# Patient Record
Sex: MALE | Race: WHITE | NOT HISPANIC OR LATINO
[De-identification: names, ages, dates, MRNs, and addresses within clinical notes are randomized per-mention and may not be internally consistent; named-entity substitution may affect disease eponyms.]

---

## 2020-12-03 ENCOUNTER — APPOINTMENT (OUTPATIENT)
Dept: UROLOGY | Facility: CLINIC | Age: 45
End: 2020-12-03
Payer: MEDICAID

## 2020-12-03 DIAGNOSIS — N50.819 TESTICULAR PAIN, UNSPECIFIED: ICD-10-CM

## 2020-12-03 PROBLEM — Z00.00 ENCOUNTER FOR PREVENTIVE HEALTH EXAMINATION: Status: ACTIVE | Noted: 2020-12-03

## 2020-12-03 PROCEDURE — 99072 ADDL SUPL MATRL&STAF TM PHE: CPT

## 2020-12-03 PROCEDURE — 99204 OFFICE O/P NEW MOD 45 MIN: CPT

## 2020-12-03 NOTE — ASSESSMENT
[FreeTextEntry1] : groin pain\par benign exam\par ? tenderness at external inguinal ring; possible early hernia? consult GS\par \par Hx of nephrolithiasis\par ? referred pain\par \par Plan for renal/scrotal US

## 2020-12-03 NOTE — HISTORY OF PRESENT ILLNESS
[FreeTextEntry1] : CC: Testicular pain, groin pain  \par \par HPI: \par 45 year old man complaining of orchalgia\par Duration: few months\par Laterality:  right \par Severity: 4/10 \par Stability: stable intensity, but more frequent pain episodes. \par Intermittent pain \par Alleviating factors:none\par Exacerbating factors: intercourse\par No n/v/fever, no LUTS other than some intermittency\par Quality: "pressing" pain, not sharp or dull \par No groin bulge, hernia, flank pain. \par \par FAMHX: Negative  \par SURGHX: Left groin mass excision (epidermal inclusion cyst), gastric sleeve\par SOCIAL: Nonsmoker, , children (9)\par ROS: Gallstones, epigastric pain, otherwise negative 10 system review \par

## 2020-12-03 NOTE — PHYSICAL EXAM
[General Appearance - Well Developed] : well developed [General Appearance - Well Nourished] : well nourished [Normal Appearance] : normal appearance [Well Groomed] : well groomed [General Appearance - In No Acute Distress] : no acute distress [Edema] : no peripheral edema [] : no respiratory distress [Respiration, Rhythm And Depth] : normal respiratory rhythm and effort [Exaggerated Use Of Accessory Muscles For Inspiration] : no accessory muscle use [Abdomen Soft] : soft [Abdomen Tenderness] : non-tender [Abdomen Mass (___ Cm)] : no abdominal mass palpated [Costovertebral Angle Tenderness] : no ~M costovertebral angle tenderness [Urethral Meatus] : meatus normal [Penis Abnormality] : normal circumcised penis [Urinary Bladder Findings] : the bladder was normal on palpation [Scrotum] : the scrotum was normal [Epididymis] : the epididymides were normal [Testes Tenderness] : no tenderness of the testes [Testes Mass (___cm)] : there were no testicular masses [FreeTextEntry1] : totally non tender genitals; tender to palpation at site of ext inguinal ring [Normal Station and Gait] : the gait and station were normal for the patient's age [Oriented To Time, Place, And Person] : oriented to person, place, and time [Affect] : the affect was normal [Mood] : the mood was normal [Not Anxious] : not anxious [No Palpable Adenopathy] : no palpable adenopathy

## 2020-12-16 ENCOUNTER — RESULT REVIEW (OUTPATIENT)
Age: 45
End: 2020-12-16

## 2020-12-16 ENCOUNTER — OUTPATIENT (OUTPATIENT)
Dept: OUTPATIENT SERVICES | Facility: HOSPITAL | Age: 45
LOS: 1 days | End: 2020-12-16

## 2020-12-16 ENCOUNTER — APPOINTMENT (OUTPATIENT)
Dept: ULTRASOUND IMAGING | Facility: CLINIC | Age: 45
End: 2020-12-16
Payer: MEDICAID

## 2020-12-16 PROCEDURE — 76870 US EXAM SCROTUM: CPT | Mod: 26

## 2020-12-16 PROCEDURE — 76770 US EXAM ABDO BACK WALL COMP: CPT | Mod: 26,59

## 2020-12-16 PROCEDURE — 93975 VASCULAR STUDY: CPT | Mod: 26

## 2021-06-22 ENCOUNTER — APPOINTMENT (OUTPATIENT)
Dept: UROLOGY | Facility: CLINIC | Age: 46
End: 2021-06-22
Payer: MEDICAID

## 2021-06-22 VITALS
BODY MASS INDEX: 35.61 KG/M2 | WEIGHT: 235 LBS | DIASTOLIC BLOOD PRESSURE: 77 MMHG | HEIGHT: 68 IN | OXYGEN SATURATION: 98 % | HEART RATE: 77 BPM | SYSTOLIC BLOOD PRESSURE: 141 MMHG | TEMPERATURE: 97.3 F

## 2021-06-22 PROCEDURE — 99213 OFFICE O/P EST LOW 20 MIN: CPT

## 2021-06-22 PROCEDURE — 76775 US EXAM ABDO BACK WALL LIM: CPT

## 2021-06-22 NOTE — HISTORY OF PRESENT ILLNESS
[FreeTextEntry1] : Orchalgia, history of stones\par \par US scrotum benign findings\par Mild intermittent discomfort \par Nothing acute\par \par Renal US today, simple cyst, no stones or hydro

## 2021-12-01 ENCOUNTER — TRANSCRIPTION ENCOUNTER (OUTPATIENT)
Age: 46
End: 2021-12-01

## 2022-11-03 ENCOUNTER — APPOINTMENT (OUTPATIENT)
Dept: UROLOGY | Facility: CLINIC | Age: 47
End: 2022-11-03

## 2022-11-03 VITALS
DIASTOLIC BLOOD PRESSURE: 84 MMHG | TEMPERATURE: 98.3 F | SYSTOLIC BLOOD PRESSURE: 127 MMHG | OXYGEN SATURATION: 98 % | HEART RATE: 98 BPM

## 2022-11-03 PROCEDURE — 99213 OFFICE O/P EST LOW 20 MIN: CPT

## 2022-11-03 RX ORDER — TAMSULOSIN HYDROCHLORIDE 0.4 MG/1
0.4 CAPSULE ORAL
Qty: 30 | Refills: 1 | Status: ACTIVE | COMMUNITY
Start: 2022-11-03 | End: 1900-01-01

## 2022-11-03 NOTE — PHYSICAL EXAM
[General Appearance - Well Developed] : well developed [General Appearance - Well Nourished] : well nourished [Normal Appearance] : normal appearance [Well Groomed] : well groomed [General Appearance - In No Acute Distress] : no acute distress [Edema] : no peripheral edema [] : no respiratory distress [Respiration, Rhythm And Depth] : normal respiratory rhythm and effort [Exaggerated Use Of Accessory Muscles For Inspiration] : no accessory muscle use [Abdomen Soft] : soft [Abdomen Tenderness] : non-tender [FreeTextEntry1] : NO CVAT  [Urinary Bladder Findings] : the bladder was normal on palpation

## 2022-11-03 NOTE — HISTORY OF PRESENT ILLNESS
[FreeTextEntry1] : CC: Flank pain \par \par \par HPI: \par 47 year old with history of colic and stones. \par \par The patient states that 2 months ago, he had right colic, his friend, a medic, administered IV, he was given 2L fluid, and felt better, no pain until 2 days ago. \par \par Two days ago, right colic returned. Location is right flank and radiates to RLQ. \par No loss of appetite; able to keep down food and fluids. \par Intermittent\par Has been 10/10 this morning, now 4/10. \par No N/V\par No fever\par No gross hematuria \par It kept him up last night; minimal sleep\par He has been taking NSAID and he cannot say for sure if helping. \par \par Renal US 12/2020 and 6/2021 no stones \par \par He has passed stones before in the past; never needed intervention. \par \par FAMHX: Negative  \par SURGHX: Left groin mass excision (epidermal inclusion cyst), gastric sleeve\par SOCIAL: Nonsmoker, , children (9)\par ROS: Gallstones, epigastric pain, otherwise negative 10 system review

## 2022-11-03 NOTE — ASSESSMENT
[FreeTextEntry1] : Diagnosis: \par Presumptive renal colic/stones \par \par Plan:\par Discussed renal/bladder US vs. CT\par Agrees to non-con low dose CT\par \par UCX\par \par Isai Hawkins MD, FACS, FRCS \par  of Urology [facility]\par Director of Laparoscopic and Robotic Surgery \par [facility] Director of Urology, [facility] \par Professor of Urology\par \par (Office) \par (Cell)  [phone] \par [email or web address]\par \par \par

## 2022-11-04 DIAGNOSIS — N28.89 OTHER SPECIFIED DISORDERS OF KIDNEY AND URETER: ICD-10-CM

## 2022-11-08 ENCOUNTER — APPOINTMENT (OUTPATIENT)
Dept: UROLOGY | Facility: CLINIC | Age: 47
End: 2022-11-08

## 2022-11-18 ENCOUNTER — NON-APPOINTMENT (OUTPATIENT)
Age: 47
End: 2022-11-18

## 2024-05-15 ENCOUNTER — APPOINTMENT (OUTPATIENT)
Dept: UROLOGY | Facility: CLINIC | Age: 49
End: 2024-05-15
Payer: COMMERCIAL

## 2024-05-15 ENCOUNTER — NON-APPOINTMENT (OUTPATIENT)
Age: 49
End: 2024-05-15

## 2024-05-15 VITALS
WEIGHT: 290 LBS | DIASTOLIC BLOOD PRESSURE: 89 MMHG | TEMPERATURE: 98 F | OXYGEN SATURATION: 96 % | SYSTOLIC BLOOD PRESSURE: 140 MMHG | HEIGHT: 70 IN | BODY MASS INDEX: 41.52 KG/M2 | HEART RATE: 84 BPM

## 2024-05-15 DIAGNOSIS — R10.9 UNSPECIFIED ABDOMINAL PAIN: ICD-10-CM

## 2024-05-15 DIAGNOSIS — Z87.442 PERSONAL HISTORY OF URINARY CALCULI: ICD-10-CM

## 2024-05-15 PROCEDURE — 99214 OFFICE O/P EST MOD 30 MIN: CPT

## 2024-05-15 NOTE — ASSESSMENT
[FreeTextEntry1] : 48 yo male with 48 hours of left flank pain in the setting of history of stones.  Renal US does not show any evidence of an obstructing stone.  His urine dip was negative for blood yesterday.  It appears less likely that this is an episode renal colic and more likely due to other etiologies.  We discussed proceeding with a CT for more definitive evaluation fo his  tract and the rest of his abdominal and pelvis to determine the source of his pain.  He agrees with this plan, but does not know if he can wait for a CT to be approved.  he is considering going to the [facility] standalone ED for further evaluation closer to his home where he should be able to get a CT scan today.  He will forward the results of the scan for my review.

## 2024-05-15 NOTE — PHYSICAL EXAM
[General Appearance - Well Nourished] : well nourished [General Appearance - Well Developed] : well developed [Heart Rate And Rhythm] : heart rate and rhythm were normal [] : no respiratory distress [Abdomen Soft] : soft [Abdomen Tenderness] : non-tender [Costovertebral Angle Tenderness] : no ~M costovertebral angle tenderness [Urethral Meatus] : meatus normal [Penis Abnormality] : normal circumcised penis [Epididymis] : the epididymides were normal [Testes Tenderness] : no tenderness of the testes [Testes Mass (___cm)] : there were no testicular masses

## 2024-05-16 LAB
APPEARANCE: CLEAR
BACTERIA: NEGATIVE /HPF
BILIRUBIN URINE: NEGATIVE
BLOOD URINE: NEGATIVE
CAST: 5 /LPF
COLOR: NORMAL
EPITHELIAL CELLS: 1 /HPF
GLUCOSE QUALITATIVE U: NEGATIVE MG/DL
KETONES URINE: ABNORMAL MG/DL
LEUKOCYTE ESTERASE URINE: NEGATIVE
MICROSCOPIC-UA: NORMAL
NITRITE URINE: NEGATIVE
PH URINE: 5.5
PROTEIN URINE: NEGATIVE MG/DL
RED BLOOD CELLS URINE: 3 /HPF
SPECIFIC GRAVITY URINE: 1.02
UROBILINOGEN URINE: 1 MG/DL
WHITE BLOOD CELLS URINE: 0 /HPF

## 2024-09-09 ENCOUNTER — APPOINTMENT (OUTPATIENT)
Dept: UROLOGY | Facility: CLINIC | Age: 49
End: 2024-09-09
Payer: COMMERCIAL

## 2024-09-09 VITALS
DIASTOLIC BLOOD PRESSURE: 73 MMHG | WEIGHT: 290 LBS | HEART RATE: 82 BPM | HEIGHT: 70 IN | BODY MASS INDEX: 41.52 KG/M2 | SYSTOLIC BLOOD PRESSURE: 134 MMHG | TEMPERATURE: 97.6 F

## 2024-09-09 DIAGNOSIS — R10.9 UNSPECIFIED ABDOMINAL PAIN: ICD-10-CM

## 2024-09-09 DIAGNOSIS — Z87.442 PERSONAL HISTORY OF URINARY CALCULI: ICD-10-CM

## 2024-09-09 PROCEDURE — 99214 OFFICE O/P EST MOD 30 MIN: CPT

## 2024-09-09 NOTE — PHYSICAL EXAM
[Normal Appearance] : normal appearance [General Appearance - In No Acute Distress] : no acute distress [Heart Rate And Rhythm] : heart rate and rhythm were normal [] : no respiratory distress [Abdomen Soft] : soft [Abdomen Tenderness] : non-tender [Normal Station and Gait] : the gait and station were normal for the patient's age [Skin Color & Pigmentation] : normal skin color and pigmentation [No Focal Deficits] : no focal deficits [Oriented To Time, Place, And Person] : oriented to person, place, and time [Not Anxious] : not anxious

## 2024-09-09 NOTE — ASSESSMENT
[FreeTextEntry1] : 48 yo male with hx  of stones and left flank pain.  It is unclear what is causing this pain.  We will send a UA and Ucx and will get a CTU.  If all  work up is negative, then will have to consider other etiologies (i.e. MASK) for his presenting symptoms.

## 2024-09-09 NOTE — HISTORY OF PRESENT ILLNESS
[FreeTextEntry1] : 5/15/24: 48 yo male with hx of stones presents with 2 days of left lateral flank pain radiating into the left groin.  he denies any fevers, nausea, vomiting, dysuria, hematuria, urgency, or frequency.  He had a CT scan on November 2022 which did not show any stones.  He had a renal US this morning which did not show any stones or hydronephrosis bilaterally.  He went to an urgent care facility yesterday where a urine dip was performed and was negative of hematuria.  He has been using NSAIDs for pain relief.  He had more relief yesterday when he got an IV dose of Toradol.  he received an IM dose of Toradol today which seems to be less effective in control his pain.   *********** 9/9/24: Patient was diagnosed with pyelonephritis after his last visit. He went to a local radiology facility where he had a CT which showed no stones, but did note mild perinephric fat stranding.  This was read as pyelonephritis.  He was treated with a course of abx after which he felt better.  The Ucx from the ED visit at that tie was negative for growth.  He now has a milder dull ache in the left flank which occasionally radiates to the left groin.  He denies any associated nausea, vomiting, fevers chills, hematuria, dysuria, urgency, or frequency.

## 2024-09-09 NOTE — HISTORY OF PRESENT ILLNESS
[FreeTextEntry1] : 5/15/24: 50 yo male with hx of stones presents with 2 days of left lateral flank pain radiating into the left groin.  he denies any fevers, nausea, vomiting, dysuria, hematuria, urgency, or frequency.  He had a CT scan on November 2022 which did not show any stones.  He had a renal US this morning which did not show any stones or hydronephrosis bilaterally.  He went to an urgent care facility yesterday where a urine dip was performed and was negative of hematuria.  He has been using NSAIDs for pain relief.  He had more relief yesterday when he got an IV dose of Toradol.  he received an IM dose of Toradol today which seems to be less effective in control his pain.   *********** 9/9/24: Patient was diagnosed with pyelonephritis after his last visit. He went to a local radiology facility where he had a CT which showed no stones, but did note mild perinephric fat stranding.  This was read as pyelonephritis.  He was treated with a course of abx after which he felt better.  The Ucx from the ED visit at that tie was negative for growth.  He now has a milder dull ache in the left flank which occasionally radiates to the left groin.  He denies any associated nausea, vomiting, fevers chills, hematuria, dysuria, urgency, or frequency.

## 2024-09-10 LAB
APPEARANCE: CLEAR
BACTERIA: NEGATIVE /HPF
BILIRUBIN URINE: NEGATIVE
BLOOD URINE: NEGATIVE
CAST: 0 /LPF
COLOR: YELLOW
EPITHELIAL CELLS: 0 /HPF
GLUCOSE QUALITATIVE U: NEGATIVE MG/DL
KETONES URINE: NEGATIVE MG/DL
LEUKOCYTE ESTERASE URINE: NEGATIVE
MICROSCOPIC-UA: NORMAL
NITRITE URINE: NEGATIVE
PH URINE: 5.5
PROTEIN URINE: NEGATIVE MG/DL
RED BLOOD CELLS URINE: 1 /HPF
SPECIFIC GRAVITY URINE: 1.02
UROBILINOGEN URINE: 0.2 MG/DL
WHITE BLOOD CELLS URINE: 0 /HPF

## 2024-09-11 LAB — BACTERIA UR CULT: NORMAL

## 2024-09-16 ENCOUNTER — APPOINTMENT (OUTPATIENT)
Dept: CT IMAGING | Facility: CLINIC | Age: 49
End: 2024-09-16
Payer: COMMERCIAL

## 2024-09-16 ENCOUNTER — OUTPATIENT (OUTPATIENT)
Dept: OUTPATIENT SERVICES | Facility: HOSPITAL | Age: 49
LOS: 1 days | End: 2024-09-16

## 2024-09-16 PROCEDURE — 74178 CT ABD&PLV WO CNTR FLWD CNTR: CPT | Mod: 26

## 2024-09-17 ENCOUNTER — NON-APPOINTMENT (OUTPATIENT)
Age: 49
End: 2024-09-17